# Patient Record
(demographics unavailable — no encounter records)

---

## 2024-10-14 NOTE — PHYSICAL EXAM
[Normal] : no focal deficits [General Appearance - Well Developed] : well developed [General Appearance - Well Nourished] : well nourished [Heart Rate And Rhythm] : heart rate and rhythm were normal [] : no respiratory distress [Abdomen Soft] : soft [Urethral Meatus] : meatus normal [Penis Abnormality] : normal circumcised penis [Normal Station and Gait] : the gait and station were normal for the patient's age [Skin Color & Pigmentation] : normal skin color and pigmentation [No Focal Deficits] : no focal deficits [Oriented To Time, Place, And Person] : oriented to person, place, and time [Chaperone Present] : A chaperone was present in the examining room during all aspects of the physical examination [FreeTextEntry1] : 75M with CaP s/p RALP 11/2023.  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

## 2024-10-14 NOTE — HISTORY OF PRESENT ILLNESS
[Erectile Dysfunction] : Erectile Dysfunction [FreeTextEntry1] : 74 year old male presenting today for a follow up visit, previously a patient of Dr. Gagnon. He has nocturia x 3. 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 CT scan at the time demonstrated "growth on prostate, growth on adrenal gland, kidney lesion." US renal 2 years ago with R renal cyst.  In the past he has had microscopic hematuria, and had two recurrences of microscopic hematuria, but none recently. He no longer takes Eliquis for DVT that has been negative on follow up ultrasound. He has also had a DVT for 2 years.   The patient had a TURP in 2009 and is managing very well. He reports that he stops drinking after dinner, but when he does drink large volumes of fluids he has increased nocturia. PVR today 24 cc. PSA 1 week ago was 3.2 ng/mL around 2.9 a year ago. His creatinine in 2020 was 1.34 and in October 2021 it was 1.28.  MRI 2 years ago with 95 g gland.  1/12: Here today with continued 3-4x nocturia. Imaging scanned in. MRI April 2022 with 60g gland PIRADS1 gland size. He had a previous TURP in 2009 with Dr. Quevedo. We discussed previously that he may be need to consider outlet therapy. We discussed HoLEP vs TURP vs RALSPP we discussed that HoLEP may offer most permanent solution. Not on AC currently. Has significant intravesical protrusion and is amenable to procedure. I have recommended Dr. James for [facility] to perform HoLEP.  8/15/23 Pt reports that he has his PSA checked and was elevated at 4ng/mL with his neurologist. Will recheck PSA today. Pt reports that he is hesitant to pursue HoLEP with Dr James but knows it needs to be done- will f/u with Dr. James once golf season is over in late September. Discussed that this is an elective decision and he can pursue the procedure whenever he feels he is ready. Most recently, his PSA was up to 4.1 n/gmL. Prior MR had demonstrated with 60-90g gland.  1/8/24 Pt presents today for PSA- s/p Prostatectomy, robot-assisted, with lymphadenectomy 20-Nov-2023. Pathology returned with organ confined pT2, with negative lymph nodes. Only leaks urine w/ spontaneous cough or valsalva. Not bothered by it. No need for a pad. Did not need PFPT. Having weak erections. Does have bothersome urinary frequency/urgency that was present preop as well. Erections are present but weaker than prior, notes he is not able to last long for intercourse w/ erection. Has sexual activity few times/monthly.  4/8/24 Pt presents today for PSA- s/p Prostatectomy, robot-assisted, with lymphadenectomy 20-Nov-2023. Pt has rare leaking of 1 drop of urine- typically when sitting for long periods of time and getting up. States did not take Cialis 5mg - wasn't sure to have it renewed- will take going forward- new script sent along with PRN 20mg Cialis to use for on demand for intercourse. No blood in urine, no UTIs. Has been getting desire, but hasn't been using PDE5is routinely. Last PSA undetectable Jan 2024 d/w patient.  7/8/24 Patient presents for follow up. PSA in April 0.02. He has variable nocturia - at times, he is able to sleep throughout the night, and on other occasions, wakes up each hour to void at night. He has noticed changes in nocturia depending on what he drinks, especially with drinks containing sugar. He also reports leg swelling and elevates his legs when he sits down and at night in bed. He remains dry and he has been taking 5 mg Cialis daily with 20 mg Cialis as needed. He is happy with his erectile function. Doing well, feels well. Has a prior history of DVT 5 years ago.  10/14: April, 2024- PSA 0.02. At previous visit patient reports being Dry, 5mg cialis daily, 20mg cialis PRN- happy with erections. Leak test performed, negative. Patient has no complaints of leakage at this time. Discussed PSA today to follow up. Follow up in the office again in 6 months.   [Urinary Incontinence] : no urinary incontinence [Urinary Retention] : no urinary retention [Urinary Urgency] : no urinary urgency [Urinary Frequency] : no urinary frequency [Dysuria] : no dysuria

## 2024-10-14 NOTE — REVIEW OF SYSTEMS
[Nocturia] : nocturia [Negative] : Psychiatric [Anal Pain: Grade 0] : Anal Pain: Grade 0 [Constipation: Grade 0] : Constipation: Grade 0 [Diarrhea: Grade 0] : Diarrhea: Grade 0 [Dyspepsia: Grade 0] : Dyspepsia: Grade 0 [Dysphagia: Grade 0] : Dysphagia: Grade 0 [Esophagitis: Grade 0] : Esophagitis: Grade 0 [Fecal Incontinence: Grade 0] : Fecal Incontinence: Grade 0 [Gastroparesis: Grade 0] : Gastroparesis: Grade 0 [Nausea: Grade 0] : Nausea: Grade 0 [Proctitis: Grade 0] : Proctitis: Grade 0 [Rectal Pain: Grade 0] : Rectal Pain: Grade 0 [Small Intestinal Obstruction: Grade 0] : Small Intestinal Obstruction: Grade 0 [Vomiting: Grade 0] : Vomiting: Grade 0 [Hematuria: Grade 0] : Hematuria: Grade 0 [Urinary Incontinence: Grade 0] : Urinary Incontinence: Grade 0  [Urinary Retention: Grade 0] : Urinary Retention: Grade 0 [Urinary Tract Pain: Grade 0] : Urinary Tract Pain: Grade 0 [Urinary Urgency: Grade 0] : Urinary Urgency: Grade 0 [Urinary Frequency: Grade 0] : Urinary Frequency: Grade 0 [Ejaculation Disorder: Grade 0] : Ejaculation Disorder: Grade 0 [Erectile Dysfunction: Grade 1 - Decrease in erectile function (frequency or rigidity of erections) but intervention not indicated (e.g., medication or use of mechanical device, penile pump)] : Erectile Dysfunction: Grade 1 - Decrease in erectile function (frequency or rigidity of erections) but intervention not indicated (e.g., medication or use of mechanical device, penile pump) [FreeTextEntry7] : dry ejaculate [FreeTextEntry8] : uses cialis + 20 mg on demand to achieve penetration

## 2025-02-24 NOTE — ASSESSMENT
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   RPA 6 months with Dr. Tera Guzman.

## 2025-02-24 NOTE — HISTORY OF PRESENT ILLNESS
[Nocturia] : nocturia [None] : None [FreeTextEntry1] : 74 year old male presenting today for a follow up visit, previously a patient of Dr. Gagnon. He has nocturia x 3. 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 CT scan at the time demonstrated "growth on prostate, growth on adrenal gland, kidney lesion." US renal 2 years ago with R renal cyst.  In the past he has had microscopic hematuria, and had two recurrences of microscopic hematuria, but none recently. He no longer takes Eliquis for DVT that has been negative on follow up ultrasound. He has also had a DVT for 2 years.   The patient had a TURP in 2009 and is managing very well. He reports that he stops drinking after dinner, but when he does drink large volumes of fluids he has increased nocturia. PVR today 24 cc. PSA 1 week ago was 3.2 ng/mL around 2.9 a year ago. His creatinine in 2020 was 1.34 and in October 2021 it was 1.28.  MRI 2 years ago with 95 g gland.  1/12: Here today with continued 3-4x nocturia. Imaging scanned in. MRI April 2022 with 60g gland PIRADS1 gland size. He had a previous TURP in 2009 with Dr. Quevedo. We discussed previously that he may be need to consider outlet therapy. We discussed HoLEP vs TURP vs RALSPP we discussed that HoLEP may offer most permanent solution. Not on AC currently. Has significant intravesical protrusion and is amenable to procedure. I have recommended Dr. James for [facility] to perform HoLEP.  8/15/23 Pt reports that he has his PSA checked and was elevated at 4ng/mL with his neurologist. Will recheck PSA today. Pt reports that he is hesitant to pursue HoLEP with Dr James but knows it needs to be done- will f/u with Dr. James once golf season is over in late September. Discussed that this is an elective decision and he can pursue the procedure whenever he feels he is ready. Most recently, his PSA was up to 4.1 n/gmL. Prior MR had demonstrated with 60-90g gland.  1/8/24 Pt presents today for PSA- s/p Prostatectomy, robot-assisted, with lymphadenectomy 20-Nov-2023. Pathology returned with organ confined pT2, with negative lymph nodes. Only leaks urine w/ spontaneous cough or valsalva. Not bothered by it. No need for a pad. Did not need PFPT. Having weak erections. Does have bothersome urinary frequency/urgency that was present preop as well. Erections are present but weaker than prior, notes he is not able to last long for intercourse w/ erection. Has sexual activity few times/monthly.  4/8/24 Pt presents today for PSA- s/p Prostatectomy, robot-assisted, with lymphadenectomy 20-Nov-2023. Pt has rare leaking of 1 drop of urine- typically when sitting for long periods of time and getting up. States did not take Cialis 5mg - wasn't sure to have it renewed- will take going forward- new script sent along with PRN 20mg Cialis to use for on demand for intercourse. No blood in urine, no UTIs. Has been getting desire, but hasn't been using PDE5is routinely. Last PSA undetectable Jan 2024 d/w patient.  7/8/24 Patient presents for follow up. PSA in April 0.02. He has variable nocturia - at times, he is able to sleep throughout the night, and on other occasions, wakes up each hour to void at night. He has noticed changes in nocturia depending on what he drinks, especially with drinks containing sugar. He also reports leg swelling and elevates his legs when he sits down and at night in bed. He remains dry and he has been taking 5 mg Cialis daily with 20 mg Cialis as needed. He is happy with his erectile function. Doing well, feels well. Has a prior history of DVT 5 years ago.  10/14: April, 2024- PSA 0.02. At previous visit patient reports being Dry, 5mg cialis daily, 20mg cialis PRN- happy with erections. Leak test performed, negative. Patient has no complaints of leakage at this time. Discussed PSA today to follow up. Follow up in the office again in 6 months.   2/24/25 Pt presents for f/u for CaP- s/p RALP 11/2023. PSA today. Pt reports intermittent stress incontinence since the end of July 2024- wears a safety pad which never gets soaked- typically happens when sitting from long periods and stands up- can go 2 weeks without any leaking but comes and goes. 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 Nocturia q30min to waking up only 2x/night but varies dependent on fluid intake- if pt stops all fluids by 4pm he wont have nocturia. Strong urinary stream. Taking 20mg PRN Cialis with good results for intercourse. To continue taking Cialis 10mg daily.   [Urinary Incontinence] : no urinary incontinence [Urinary Retention] : no urinary retention [Urinary Urgency] : no urinary urgency [Urinary Frequency] : no urinary frequency [Straining] : no straining [Weak Stream] : no weak stream [Intermittency] : no intermittency [Post-Void Dribbling] : no post-void dribbling [Erectile Dysfunction] : no Erectile Dysfunction [Dysuria] : no dysuria [Hematuria - Gross] : no gross hematuria